# Patient Record
Sex: MALE | Race: WHITE
[De-identification: names, ages, dates, MRNs, and addresses within clinical notes are randomized per-mention and may not be internally consistent; named-entity substitution may affect disease eponyms.]

---

## 2019-02-03 ENCOUNTER — HOSPITAL ENCOUNTER (EMERGENCY)
Dept: HOSPITAL 89 - ER | Age: 44
Discharge: HOME | End: 2019-02-03
Payer: MEDICAID

## 2019-02-03 VITALS — SYSTOLIC BLOOD PRESSURE: 152 MMHG | DIASTOLIC BLOOD PRESSURE: 100 MMHG

## 2019-02-03 DIAGNOSIS — S46.911A: Primary | ICD-10-CM

## 2019-02-03 PROCEDURE — 96372 THER/PROPH/DIAG INJ SC/IM: CPT

## 2019-02-03 PROCEDURE — 99283 EMERGENCY DEPT VISIT LOW MDM: CPT

## 2019-02-03 NOTE — ER REPORT
History and Physical


Time Seen By MD:  22:35


Hx. of Stated Complaint:  


PT REPORTS RIGHT SHOULDER PAIN X 1 DAY, REPORTS MOVING PIPE YESTERDAY


HPI/ROS


CHIEF COMPLAINT: Shoulder pain





HISTORY OF PRESENT ILLNESS: Patient is  who for about 4 hours


as today was lifting heavy pipes. He did not have a specific incident that 


bothered him, however noted that he ultimately developed right sided shoulder 


discomfort throughout the day that has gotten worse. Patient has taken a couple 


Tylenol today and 2 ibuprofen earlier today without significant relief. He does 


not note any deformity or change in range of motion, and feels that he has maybe


pulled a muscle. He has no difficulty breathing, chest pain, or previous 


problems with this shoulder.





REVIEW OF SYSTEMS:


Respiratory: No cough, no dyspnea.


Cardiovascular: No chest pain, no palpitations.


Gastrointestinal: No vomiting, no abdominal pain.


Musculoskeletal: No back pain.


Remainder of the 14 system rev:  Yes


Allergies:  


Coded Allergies:  


     Penicillins (Verified  Allergy, Unknown, 2/3/19)


Home Meds


No Active Prescriptions or Reported Meds


Reviewed Nurses Notes:  Yes


Constitutional





Vital Sign - Last 24 Hours








 2/3/19





 21:41


 


Temp 98.6


 


Pulse 100


 


Resp 18


 


B/P (MAP) 153/100


 


Pulse Ox 95


 


O2 Delivery Room Air








Physical Exam


  General Appearance: The patient is alert, has no immediate need for airway 


protection and no current signs of toxicity.  


Eyes: Pupils equal and round no injection.


Respiratory: Chest is non tender, lungs are clear to auscultation.


Cardiac: regular rate and rhythm 


Musculoskeletal:  Neck: Neck is supple and non tender.


   Extremities have full range of motion and are non tender with the exception 


of right shoulder. Patient has mild bony tenderness at distal acromion, though 


no before meals tenderness. He has no bony step-off. He has full range of motion


in the right shoulder both passively and actively. He has slight discomfort on 


external rotation, though minimal discomfort on internal rotation. He has no 


palpable muscle belly defect of arm or shoulder. He has tenderness directly over


the head of the biceps as well as the bicipital tendon. He also has tenderness 


over the anterior head of the deltoid. There is no contusion, ecchymosis, 


laceration, rash.


Skin: No rashes or lesions.


[ ]


DIFFERENTIAL DIAGNOSIS: After history and physical exam differential diagnosis 


was considered for shoulder dislocation, AC sprain, muscle tear, rotator cuff or


other injury.





Medical Decision Making


ED Course/Re-evaluation


ED Course


43-year-old male presents with right shoulder pain. Pain is most consistent with


bicipital tendon inflammation and possible mild rotator cuff strain, though he 


does not have clear or significant findings for other etiologies. There is no 


shoulder dislocation. I offered a sling and recommend continued rice treatment. 


Patient is comfortable with this plan. He understands strict return precautions.


Decision to Disposition Date:  Feb 3, 2019


Decision to Disposition Time:  22:52





Depart


Departure


Latest Vital Signs





Vital Signs








  Date Time  Temp Pulse Resp B/P (MAP) Pulse Ox O2 Delivery O2 Flow Rate FiO2


 


2/3/19 21:41 98.6 100 18 153/100 95 Room Air  








Impression:  


   Primary Impression:  


   Strain of shoulder, right


Condition:  Improved


Disposition:  HOME OR SELF-CARE


New Scripts


No Active Prescriptions or Reported Meds


Patient Instructions:  Shoulder Sprain (ED)





Additional Instructions:  


As we discussed, use sling as needed for comfort. Ice 20 minutes at a time. Use 


ibuprofen 600mg every 8 hours as needed and tylenol 650mg every 6 hours as 


needed.





Problem Qualifiers








   Primary Impression:  


   Strain of shoulder, right


   Encounter type:  initial encounter  Qualified Codes:  S46.911A - Strain of 


   unspecified muscle, fascia and tendon at shoulder and upper arm level, right 


   arm, initial encounter








KEENAN MORAES MD                 Feb 3, 2019 22:46